# Patient Record
Sex: FEMALE | URBAN - METROPOLITAN AREA
[De-identification: names, ages, dates, MRNs, and addresses within clinical notes are randomized per-mention and may not be internally consistent; named-entity substitution may affect disease eponyms.]

---

## 2023-10-25 ENCOUNTER — OFFICE VISIT (OUTPATIENT)
Dept: URBAN - METROPOLITAN AREA CLINIC 92 | Facility: CLINIC | Age: 28
End: 2023-10-25

## 2023-11-15 ENCOUNTER — OFFICE VISIT (OUTPATIENT)
Dept: URBAN - METROPOLITAN AREA CLINIC 92 | Facility: CLINIC | Age: 28
End: 2023-11-15
Payer: COMMERCIAL

## 2023-11-15 ENCOUNTER — LAB OUTSIDE AN ENCOUNTER (OUTPATIENT)
Dept: URBAN - METROPOLITAN AREA CLINIC 92 | Facility: CLINIC | Age: 28
End: 2023-11-15

## 2023-11-15 ENCOUNTER — OFFICE VISIT (OUTPATIENT)
Dept: URBAN - METROPOLITAN AREA CLINIC 92 | Facility: CLINIC | Age: 28
End: 2023-11-15

## 2023-11-15 VITALS
DIASTOLIC BLOOD PRESSURE: 86 MMHG | SYSTOLIC BLOOD PRESSURE: 145 MMHG | HEART RATE: 59 BPM | BODY MASS INDEX: 27.78 KG/M2 | WEIGHT: 177 LBS | HEIGHT: 67 IN | TEMPERATURE: 97.2 F

## 2023-11-15 DIAGNOSIS — K62.5 BRBPR (BRIGHT RED BLOOD PER RECTUM): ICD-10-CM

## 2023-11-15 PROCEDURE — 99204 OFFICE O/P NEW MOD 45 MIN: CPT | Performed by: INTERNAL MEDICINE

## 2023-11-15 RX ORDER — SODIUM, POTASSIUM,MAG SULFATES 17.5-3.13G
177ML SOLUTION, RECONSTITUTED, ORAL ORAL
Qty: 2 | Refills: 0 | OUTPATIENT
Start: 2023-11-15 | End: 2023-11-17

## 2023-11-15 NOTE — HPI-TODAY'S VISIT:
27yF with no sig PMH who presents with BRBPR for one year, in the stool and in the water. Occasional anal pain (feels like pasing glass), but sometimes bleeding painless. Sess blood 2-3 times/week. BMs are daily to BID, no constipation. Occasional diarrhea. No CRC or IBD in the family. No anal pain recently.

## 2023-12-05 ENCOUNTER — TELEPHONE ENCOUNTER (OUTPATIENT)
Dept: URBAN - METROPOLITAN AREA CLINIC 92 | Facility: CLINIC | Age: 28
End: 2023-12-05

## 2023-12-05 ENCOUNTER — OUT OF OFFICE VISIT (OUTPATIENT)
Dept: URBAN - METROPOLITAN AREA SURGERY CENTER 16 | Facility: SURGERY CENTER | Age: 28
End: 2023-12-05
Payer: COMMERCIAL

## 2023-12-05 ENCOUNTER — LAB OUTSIDE AN ENCOUNTER (OUTPATIENT)
Dept: URBAN - METROPOLITAN AREA CLINIC 92 | Facility: CLINIC | Age: 28
End: 2023-12-05

## 2023-12-05 ENCOUNTER — CLAIMS CREATED FROM THE CLAIM WINDOW (OUTPATIENT)
Dept: URBAN - METROPOLITAN AREA CLINIC 4 | Facility: CLINIC | Age: 28
End: 2023-12-05
Payer: COMMERCIAL

## 2023-12-05 DIAGNOSIS — D12.5 BENIGN NEOPLASM OF SIGMOID COLON: ICD-10-CM

## 2023-12-05 DIAGNOSIS — K92.1 ACUTE MELENA: ICD-10-CM

## 2023-12-05 DIAGNOSIS — K63.89 APPENDICITIS EPIPLOICA: ICD-10-CM

## 2023-12-05 DIAGNOSIS — D12.4 BENIGN NEOPLASM OF DESCENDING COLON: ICD-10-CM

## 2023-12-05 DIAGNOSIS — K64.8 EXTERNAL HEMORRHOIDS: ICD-10-CM

## 2023-12-05 DIAGNOSIS — K63.5 BENIGN COLON POLYP: ICD-10-CM

## 2023-12-05 DIAGNOSIS — K92.1 MELENA: ICD-10-CM

## 2023-12-05 PROCEDURE — 45385 COLONOSCOPY W/LESION REMOVAL: CPT | Performed by: INTERNAL MEDICINE

## 2023-12-05 PROCEDURE — G8907 PT DOC NO EVENTS ON DISCHARG: HCPCS | Performed by: INTERNAL MEDICINE

## 2023-12-05 PROCEDURE — 00811 ANES LWR INTST NDSC NOS: CPT | Performed by: ANESTHESIOLOGIST ASSISTANT

## 2023-12-05 PROCEDURE — 88305 TISSUE EXAM BY PATHOLOGIST: CPT | Performed by: PATHOLOGY

## 2023-12-05 PROCEDURE — 00811 ANES LWR INTST NDSC NOS: CPT | Performed by: ANESTHESIOLOGY

## 2023-12-07 ENCOUNTER — WEB ENCOUNTER (OUTPATIENT)
Dept: URBAN - METROPOLITAN AREA CLINIC 92 | Facility: CLINIC | Age: 28
End: 2023-12-07

## 2024-01-09 ENCOUNTER — DASHBOARD ENCOUNTERS (OUTPATIENT)
Age: 29
End: 2024-01-09

## 2024-01-09 ENCOUNTER — OFFICE VISIT (OUTPATIENT)
Dept: URBAN - METROPOLITAN AREA CLINIC 92 | Facility: CLINIC | Age: 29
End: 2024-01-09
Payer: COMMERCIAL

## 2024-01-09 ENCOUNTER — LAB OUTSIDE AN ENCOUNTER (OUTPATIENT)
Dept: URBAN - METROPOLITAN AREA CLINIC 92 | Facility: CLINIC | Age: 29
End: 2024-01-09

## 2024-01-09 VITALS
DIASTOLIC BLOOD PRESSURE: 80 MMHG | SYSTOLIC BLOOD PRESSURE: 128 MMHG | TEMPERATURE: 97 F | WEIGHT: 178 LBS | HEART RATE: 73 BPM | BODY MASS INDEX: 27.94 KG/M2 | HEIGHT: 67 IN

## 2024-01-09 DIAGNOSIS — Z86.010 PERSONAL HISTORY OF COLONIC POLYPS: ICD-10-CM

## 2024-01-09 DIAGNOSIS — K62.5 BRBPR (BRIGHT RED BLOOD PER RECTUM): ICD-10-CM

## 2024-01-09 PROCEDURE — 99214 OFFICE O/P EST MOD 30 MIN: CPT | Performed by: INTERNAL MEDICINE

## 2024-01-09 RX ORDER — SODIUM, POTASSIUM,MAG SULFATES 17.5-3.13G
177ML SOLUTION, RECONSTITUTED, ORAL ORAL
Qty: 2 | Refills: 0 | OUTPATIENT
Start: 2024-01-09 | End: 2024-01-11

## 2024-01-09 NOTE — HPI-TODAY'S VISIT:
27yF with no sig PMH who presents with BRBPR for one year, in the stool and in the water. Occasional anal pain (feels like pasing glass), but sometimes bleeding painless. Sess blood 2-3 times/week. BMs are daily to BID, no constipation. Occasional diarrhea. No CRC or IBD in the family. No anal pain recently.  ***1/2024: Colonoscopy December 2023 notable for a 20 mm, 50 mm, and 7 mm polyp.  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  I recommended repeat colonoscopy in 6 months and a genetics referral. No futher bleeding.

## 2024-06-06 ENCOUNTER — OUT OF OFFICE VISIT (OUTPATIENT)
Dept: URBAN - METROPOLITAN AREA SURGERY CENTER 7 | Facility: SURGERY CENTER | Age: 29
End: 2024-06-06
Payer: COMMERCIAL

## 2024-06-06 ENCOUNTER — OFFICE VISIT (OUTPATIENT)
Dept: URBAN - METROPOLITAN AREA SURGERY CENTER 7 | Facility: SURGERY CENTER | Age: 29
End: 2024-06-06

## 2024-06-06 ENCOUNTER — CLAIMS CREATED FROM THE CLAIM WINDOW (OUTPATIENT)
Dept: URBAN - METROPOLITAN AREA CLINIC 4 | Facility: CLINIC | Age: 29
End: 2024-06-06
Payer: COMMERCIAL

## 2024-06-06 DIAGNOSIS — D12.0 BENIGN NEOPLASM OF CECUM: ICD-10-CM

## 2024-06-06 DIAGNOSIS — R19.5 OCCULT BLOOD + STOOL: ICD-10-CM

## 2024-06-06 DIAGNOSIS — Z12.11 COLON CANCER SCREENING (HIGH RISK): ICD-10-CM

## 2024-06-06 DIAGNOSIS — Z86.010 ADENOMAS PERSONAL HISTORY OF COLONIC POLYPS: ICD-10-CM

## 2024-06-06 PROCEDURE — 00812 ANES LWR INTST SCR COLSC: CPT | Performed by: NURSE ANESTHETIST, CERTIFIED REGISTERED

## 2024-06-06 PROCEDURE — 88305 TISSUE EXAM BY PATHOLOGIST: CPT | Performed by: PATHOLOGY

## 2024-06-25 ENCOUNTER — TELEPHONE ENCOUNTER (OUTPATIENT)
Dept: URBAN - METROPOLITAN AREA CLINIC 92 | Facility: CLINIC | Age: 29
End: 2024-06-25